# Patient Record
Sex: FEMALE | Race: WHITE | Employment: STUDENT
[De-identification: names, ages, dates, MRNs, and addresses within clinical notes are randomized per-mention and may not be internally consistent; named-entity substitution may affect disease eponyms.]

---

## 2017-11-26 ENCOUNTER — HEALTH MAINTENANCE LETTER (OUTPATIENT)
Age: 22
End: 2017-11-26

## 2019-01-17 ENCOUNTER — HOSPITAL ENCOUNTER (EMERGENCY)
Facility: CLINIC | Age: 24
Discharge: HOME | End: 2019-01-18
Attending: EMERGENCY MEDICINE | Admitting: EMERGENCY MEDICINE
Payer: COMMERCIAL

## 2019-01-17 DIAGNOSIS — B34.9 VIRAL INFECTION: ICD-10-CM

## 2019-01-17 PROCEDURE — 99284 EMERGENCY DEPT VISIT MOD MDM: CPT | Mod: 25 | Performed by: EMERGENCY MEDICINE

## 2019-01-17 PROCEDURE — 99284 EMERGENCY DEPT VISIT MOD MDM: CPT | Mod: Z6 | Performed by: EMERGENCY MEDICINE

## 2019-01-17 RX ORDER — IPRATROPIUM BROMIDE AND ALBUTEROL SULFATE 2.5; .5 MG/3ML; MG/3ML
3 SOLUTION RESPIRATORY (INHALATION) ONCE
Status: COMPLETED | OUTPATIENT
Start: 2019-01-17 | End: 2019-01-18

## 2019-01-17 RX ORDER — OSELTAMIVIR PHOSPHATE 30 MG/1
75 CAPSULE ORAL 2 TIMES DAILY
COMMUNITY

## 2019-01-17 RX ORDER — CODEINE PHOSPHATE AND GUAIFENESIN 10; 100 MG/5ML; MG/5ML
1-2 SOLUTION ORAL EVERY 4 HOURS PRN
COMMUNITY

## 2019-01-17 RX ORDER — PREDNISONE 10 MG/1
60 TABLET ORAL DAILY
COMMUNITY

## 2019-01-17 RX ORDER — CODEINE PHOSPHATE AND GUAIFENESIN 10; 100 MG/5ML; MG/5ML
5 SOLUTION ORAL
Status: COMPLETED | OUTPATIENT
Start: 2019-01-18 | End: 2019-01-18

## 2019-01-17 RX ORDER — SODIUM CHLORIDE 9 MG/ML
1000 INJECTION, SOLUTION INTRAVENOUS CONTINUOUS
Status: DISCONTINUED | OUTPATIENT
Start: 2019-01-17 | End: 2019-01-18 | Stop reason: HOSPADM

## 2019-01-17 ASSESSMENT — MIFFLIN-ST. JEOR: SCORE: 1558.28

## 2019-01-17 NOTE — ED AVS SNAPSHOT
[facility], [place], Emergency Department  [address] [place] [id number]  Phone:  [phone]                                    Ashlie Centeno   MRN: [id number]    Department:  [facility], Emergency Department   Date of Visit:  1/17/2019           After Visit Summary Signature Page    I have received my discharge instructions, and my questions have been answered. I have discussed any challenges I see with this plan with the nurse or doctor.    ..........................................................................................................................................  Patient/Patient Representative Signature      ..........................................................................................................................................  Patient Representative Print Name and Relationship to Patient    ..................................................               ................................................  Date                                   Time    ..........................................................................................................................................  Reviewed by Signature/Title    ...................................................              ..............................................  Date                                               Time          22EPIC Rev 08/18

## 2019-01-18 ENCOUNTER — APPOINTMENT (OUTPATIENT)
Dept: GENERAL RADIOLOGY | Facility: CLINIC | Age: 24
End: 2019-01-18
Payer: COMMERCIAL

## 2019-01-18 VITALS
DIASTOLIC BLOOD PRESSURE: 93 MMHG | OXYGEN SATURATION: 98 % | WEIGHT: 185 LBS | SYSTOLIC BLOOD PRESSURE: 117 MMHG | TEMPERATURE: 98.9 F | HEART RATE: 118 BPM | RESPIRATION RATE: 16 BRPM | HEIGHT: 63 IN | BODY MASS INDEX: 32.78 KG/M2

## 2019-01-18 LAB
ANION GAP SERPL CALCULATED.3IONS-SCNC: 9 MMOL/L (ref 3–14)
BASOPHILS # BLD AUTO: 0 10E9/L (ref 0–0.2)
BASOPHILS NFR BLD AUTO: 0.2 %
BUN SERPL-MCNC: 8 MG/DL (ref 7–30)
CALCIUM SERPL-MCNC: 8.5 MG/DL (ref 8.5–10.1)
CHLORIDE SERPL-SCNC: 110 MMOL/L (ref 94–109)
CO2 SERPL-SCNC: 23 MMOL/L (ref 20–32)
CREAT SERPL-MCNC: 0.76 MG/DL (ref 0.52–1.04)
DIFFERENTIAL METHOD BLD: ABNORMAL
EOSINOPHIL # BLD AUTO: 0 10E9/L (ref 0–0.7)
EOSINOPHIL NFR BLD AUTO: 0.2 %
ERYTHROCYTE [DISTWIDTH] IN BLOOD BY AUTOMATED COUNT: 12.5 % (ref 10–15)
GFR SERPL CREATININE-BSD FRML MDRD: >90 ML/MIN/{1.73_M2}
GLUCOSE SERPL-MCNC: 128 MG/DL (ref 70–99)
HCT VFR BLD AUTO: 39.1 % (ref 35–47)
HGB BLD-MCNC: 13 G/DL (ref 11.7–15.7)
IMM GRANULOCYTES # BLD: 0 10E9/L (ref 0–0.4)
IMM GRANULOCYTES NFR BLD: 0.4 %
LYMPHOCYTES # BLD AUTO: 0.3 10E9/L (ref 0.8–5.3)
LYMPHOCYTES NFR BLD AUTO: 4.9 %
MCH RBC QN AUTO: 30 PG (ref 26.5–33)
MCHC RBC AUTO-ENTMCNC: 33.2 G/DL (ref 31.5–36.5)
MCV RBC AUTO: 90 FL (ref 78–100)
MONOCYTES # BLD AUTO: 0.1 10E9/L (ref 0–1.3)
MONOCYTES NFR BLD AUTO: 1.9 %
NEUTROPHILS # BLD AUTO: 5 10E9/L (ref 1.6–8.3)
NEUTROPHILS NFR BLD AUTO: 92.4 %
NRBC # BLD AUTO: 0 10*3/UL
NRBC BLD AUTO-RTO: 0 /100
PLATELET # BLD AUTO: 230 10E9/L (ref 150–450)
POTASSIUM SERPL-SCNC: 2.8 MMOL/L (ref 3.4–5.3)
RBC # BLD AUTO: 4.33 10E12/L (ref 3.8–5.2)
SODIUM SERPL-SCNC: 142 MMOL/L (ref 133–144)
WBC # BLD AUTO: 5.4 10E9/L (ref 4–11)

## 2019-01-18 PROCEDURE — 25000128 H RX IP 250 OP 636: Performed by: EMERGENCY MEDICINE

## 2019-01-18 PROCEDURE — 71046 X-RAY EXAM CHEST 2 VIEWS: CPT

## 2019-01-18 PROCEDURE — 96374 THER/PROPH/DIAG INJ IV PUSH: CPT | Performed by: EMERGENCY MEDICINE

## 2019-01-18 PROCEDURE — 94640 AIRWAY INHALATION TREATMENT: CPT | Performed by: EMERGENCY MEDICINE

## 2019-01-18 PROCEDURE — 80048 BASIC METABOLIC PNL TOTAL CA: CPT | Performed by: EMERGENCY MEDICINE

## 2019-01-18 PROCEDURE — 25000125 ZZHC RX 250: Performed by: EMERGENCY MEDICINE

## 2019-01-18 PROCEDURE — 96361 HYDRATE IV INFUSION ADD-ON: CPT | Performed by: EMERGENCY MEDICINE

## 2019-01-18 PROCEDURE — 25000132 ZZH RX MED GY IP 250 OP 250 PS 637: Performed by: EMERGENCY MEDICINE

## 2019-01-18 PROCEDURE — 85025 COMPLETE CBC W/AUTO DIFF WBC: CPT | Performed by: EMERGENCY MEDICINE

## 2019-01-18 RX ORDER — KETOROLAC TROMETHAMINE 30 MG/ML
30 INJECTION, SOLUTION INTRAMUSCULAR; INTRAVENOUS ONCE
Status: COMPLETED | OUTPATIENT
Start: 2019-01-18 | End: 2019-01-18

## 2019-01-18 RX ORDER — POTASSIUM CHLORIDE 20MEQ/15ML
60 LIQUID (ML) ORAL ONCE
Status: COMPLETED | OUTPATIENT
Start: 2019-01-18 | End: 2019-01-18

## 2019-01-18 RX ORDER — IPRATROPIUM BROMIDE AND ALBUTEROL SULFATE 2.5; .5 MG/3ML; MG/3ML
3 SOLUTION RESPIRATORY (INHALATION) ONCE
Status: COMPLETED | OUTPATIENT
Start: 2019-01-18 | End: 2019-01-18

## 2019-01-18 RX ADMIN — GUAIFENESIN AND CODEINE PHOSPHATE 5 ML: 10; 100 LIQUID ORAL at 00:36

## 2019-01-18 RX ADMIN — POTASSIUM CHLORIDE 60 MEQ: 20 SOLUTION ORAL at 01:26

## 2019-01-18 RX ADMIN — SODIUM CHLORIDE 1000 ML: 9 INJECTION, SOLUTION INTRAVENOUS at 00:24

## 2019-01-18 RX ADMIN — KETOROLAC TROMETHAMINE 30 MG: 30 INJECTION, SOLUTION INTRAMUSCULAR at 01:14

## 2019-01-18 RX ADMIN — IPRATROPIUM BROMIDE AND ALBUTEROL SULFATE 3 ML: .5; 3 SOLUTION RESPIRATORY (INHALATION) at 01:13

## 2019-01-18 RX ADMIN — IPRATROPIUM BROMIDE AND ALBUTEROL SULFATE 3 ML: .5; 3 SOLUTION RESPIRATORY (INHALATION) at 00:24

## 2019-01-18 ASSESSMENT — ENCOUNTER SYMPTOMS
DIAPHORESIS: 1
VOMITING: 1
NAUSEA: 1
MYALGIAS: 1
FEVER: 1
SHORTNESS OF BREATH: 1
COUGH: 1

## 2019-01-18 NOTE — ED PROVIDER NOTES
"  History     Chief Complaint   Patient presents with     Flu Symptoms     The history is provided by the patient, the spouse and medical records.     Ashlie Centeno is a 23 year old female with a medical history of asthma, drug induced hepatitis and cirrhosis, fibromyalgia, and Raynaud's disease who presents to the Emergency Department for evaluation of flu like symptoms. Starting on 1/14/2019, patient started to feel ill, endorsing vomiting, intermittent diarrhea and fevers.  Per chart review, [facility] for worsening cough, nausea and vomiting, with history and physical exam most consistent with viral syndrome.  Per chart review, she has not had influenza vaccine this year because \"she did not believe in them\".  She states that influenza screening at ED came up negative. On 1/16/2019, patient visited her PCP who prescribed Robitussin and Tamiflu.  Patient states she had an elevated temperature at home of 102.5 degrees F.  Over the next 24 hours the patient's fever abated and her temperature decreased to 98 degrees. Today, her temperature jenn to 102.2 degrees after which she called the on-call physician who referred her to the ED. She reports: A productive cough with green tinge, generalized body aches, diaphoresis, shortness of breath, as well as waking up disoriented and struggling to ambulate.  She reports taking prednisone at 8:30 AM.  She denies vomiting or diarrhea today.      I have reviewed the Medications, Allergies, Past Medical and Surgical History, and Social History in the [employer] system.  Past Medical History:   Diagnosis Date     ADHD (attention deficit hyperactivity disorder)      Allergies, food      Anxiety      Asthma      Asthma with hay fever        Past Surgical History:   Procedure Laterality Date     BIOPSY       ESOPHAGOSCOPY, GASTROSCOPY, DUODENOSCOPY (EGD), COMBINED  2/14/2014    Procedure: COMBINED ESOPHAGOSCOPY, GASTROSCOPY, DUODENOSCOPY (EGD);;  " Surgeon: Jean Marie Pritchett MD;  Location:  GI     MYRINGOTOMY, INSERT TUBE, COMBINED       TONSILLECTOMY  8/2012       Family History   Problem Relation Age of Onset     Thyroid Disease Mother         Hashimoto's     Endocrine Disease Mother         Cushings     Obesity Mother      Depression Mother      Lupus Maternal Aunt         mother's maternal aunt had scleroderma, one sister had SJ syndrome     Diabetes Maternal Aunt      Arthritis Paternal Grandfather         RA at age 30     Blood Disease Paternal Grandfather         blood clot, great PGF ahd,  blood clot, mother's niece and cousin have lupus     Cancer Paternal Grandfather         Throat     Alcohol/Drug Paternal Grandfather      Heart Disease Paternal Grandfather      Obesity Paternal Grandfather      Respiratory Paternal Grandfather      Family History Negative Other         neg for liver disease     Depression Father      Obesity Father      Depression Sister      Hypertension Maternal Grandmother      Arthritis Maternal Grandmother      Diabetes Maternal Grandfather      Alcohol/Drug Maternal Grandfather      Heart Disease Maternal Grandfather      Obesity Maternal Grandfather      Cancer Maternal Aunt         Lung     Alcohol/Drug Maternal Aunt      Alcohol/Drug Maternal Uncle        Social History     Tobacco Use     Smoking status: Never Smoker     Smokeless tobacco: Never Used   Substance Use Topics     Alcohol use: No       Current Facility-Administered Medications   Medication     0.9% sodium chloride BOLUS    Followed by     sodium chloride 0.9% infusion     guaiFENesin-codeine (ROBITUSSIN AC) 100-10 MG/5ML solution 5 mL     ipratropium - albuterol 0.5 mg/2.5 mg/3 mL (DUONEB) neb solution 3 mL     Current Outpatient Medications   Medication     guaiFENesin-codeine (ROBITUSSIN AC) 100-10 MG/5ML solution     oseltamivir (TAMIFLU) 30 MG capsule     predniSONE (DELTASONE) 10 MG tablet     albuterol (2.5 MG/3ML) 0.083% nebulizer solution      "albuterol (PROAIR HFA, PROVENTIL HFA, VENTOLIN HFA) 108 (90 BASE) MCG/ACT inhaler     EPINEPHrine (EPIPEN) 0.3 MG/0.3ML injection     levonorgestrel (MIRENA) 20 MCG/24HR IUD        Allergies   Allergen Reactions     Fentanyl Citrate Itching     Puffy face, itching      Animal Dander Itching and Swelling     Rodents     Cats Itching and Swelling     Dogs Itching and Swelling     Dust Mites Itching and Swelling     Seasonal Allergies Other (See Comments), Itching and Swelling     Sulfa Drugs      MD doesn't want pt to take due to family history of severe reactions to sulfa drugs, also with patient's autoimmune status. FH of SJS         Review of Systems   Constitutional: Positive for diaphoresis and fever.   Respiratory: Positive for cough (productive) and shortness of breath.    Gastrointestinal: Positive for nausea (resolved) and vomiting (resolved).   Musculoskeletal: Positive for myalgias.   All other systems reviewed and are negative.      Physical Exam   BP: 147/78  Heart Rate: 114  Temp: 99  F (37.2  C)  Resp: 16  Height: 160 cm (5' 3\")  Weight: 83.9 kg (185 lb)  SpO2: 98 %      Physical Exam   Constitutional: She is oriented to person, place, and time. She appears well-developed and well-nourished.   HENT:   Head: Normocephalic and atraumatic.   Neck: Normal range of motion.   Cardiovascular: Normal rate, regular rhythm and normal heart sounds.   Pulmonary/Chest: Effort normal and breath sounds normal. No stridor. No respiratory distress. She has no wheezes.   Abdominal: Soft. She exhibits no distension. There is no tenderness. There is no rebound.   Musculoskeletal: She exhibits no tenderness.   Neurological: She is alert and oriented to person, place, and time. No cranial nerve deficit. Coordination normal.   Skin: Skin is warm and dry.   Psychiatric: She has a normal mood and affect. Her behavior is normal. Thought content normal.       ED Course       11:48 PM  The patient was seen and examined by Kasia" MD Robyn in Room ED04.     Procedures             Critical Care time:  none             Labs Ordered and Resulted from Time of ED Arrival Up to the Time of Departure from the ED - No data to display     No results found for this or any previous visit (from the past 24 hour(s)).           Assessments & Plan (with Medical Decision Making)   Is a 23-year-old female presents to the ER with URI symptoms for the past 4 days.  Patient had some vomiting and diarrhea that is now resolved for the last day and a half.  Patient was started on Tamiflu by her PCP yesterday.  Patient here sounds well on room air.  She satting well.  Patient had a chest x-ray that is negative.  We will give the patient some fluids and make sure her BMP and CBC are negative.  Patient will likely be discharged home with instructions to follow-up with her PCP as needed.  Patient is already started her home has asthma exacerbation prednisone course.    I have reviewed the nursing notes.    I have reviewed the findings, diagnosis, plan and need for follow up with the patient.       Medication List      There are no discharge medications for this visit.         Final diagnoses:   Viral infection     Obie STORY, am serving as a trained medical scribe to document services personally performed by Kasia Carlson MD, based on the provider's statements to me.   Kasia STORY MD, was physically present and have reviewed and verified the accuracy of this note documented by Obie Meza.   1/17/2019   [facility], [place], EMERGENCY DEPARTMENT     Kasia Carlson MD  01/18/19 0102

## 2019-01-18 NOTE — ED TRIAGE NOTES
Pt. presents to ED with c/o increased cough, SOB, nausea, vomiting, diarrhea, generalized weakness, generalized body aches. Tachycardic, OVSS on RA.

## 2019-01-18 NOTE — DISCHARGE INSTRUCTIONS
Take the albuterol as needed.     Finish your course of steroids.     Please make an appointment to follow up with Your Primary Care Provider in 2-4 days even if entirely better.

## 2020-03-02 ENCOUNTER — HEALTH MAINTENANCE LETTER (OUTPATIENT)
Age: 25
End: 2020-03-02

## 2020-12-14 ENCOUNTER — HEALTH MAINTENANCE LETTER (OUTPATIENT)
Age: 25
End: 2020-12-14

## 2021-04-18 ENCOUNTER — HEALTH MAINTENANCE LETTER (OUTPATIENT)
Age: 26
End: 2021-04-18

## 2021-10-02 ENCOUNTER — HEALTH MAINTENANCE LETTER (OUTPATIENT)
Age: 26
End: 2021-10-02

## 2022-05-14 ENCOUNTER — HEALTH MAINTENANCE LETTER (OUTPATIENT)
Age: 27
End: 2022-05-14

## 2022-09-03 ENCOUNTER — HEALTH MAINTENANCE LETTER (OUTPATIENT)
Age: 27
End: 2022-09-03

## 2023-06-03 ENCOUNTER — HEALTH MAINTENANCE LETTER (OUTPATIENT)
Age: 28
End: 2023-06-03